# Patient Record
Sex: FEMALE
[De-identification: names, ages, dates, MRNs, and addresses within clinical notes are randomized per-mention and may not be internally consistent; named-entity substitution may affect disease eponyms.]

---

## 2019-04-24 ENCOUNTER — HOSPITAL ENCOUNTER (INPATIENT)
Dept: HOSPITAL 88 - ER | Age: 69
LOS: 3 days | Discharge: HOME | DRG: 68 | End: 2019-04-27
Attending: INTERNAL MEDICINE | Admitting: INTERNAL MEDICINE
Payer: MEDICARE

## 2019-04-24 VITALS — SYSTOLIC BLOOD PRESSURE: 195 MMHG | DIASTOLIC BLOOD PRESSURE: 86 MMHG

## 2019-04-24 VITALS — DIASTOLIC BLOOD PRESSURE: 81 MMHG | SYSTOLIC BLOOD PRESSURE: 183 MMHG

## 2019-04-24 VITALS — SYSTOLIC BLOOD PRESSURE: 216 MMHG | DIASTOLIC BLOOD PRESSURE: 94 MMHG

## 2019-04-24 VITALS — WEIGHT: 208.06 LBS | HEIGHT: 64 IN | BODY MASS INDEX: 35.52 KG/M2

## 2019-04-24 DIAGNOSIS — S52.571A: ICD-10-CM

## 2019-04-24 DIAGNOSIS — D63.8: ICD-10-CM

## 2019-04-24 DIAGNOSIS — I12.9: ICD-10-CM

## 2019-04-24 DIAGNOSIS — Z79.4: ICD-10-CM

## 2019-04-24 DIAGNOSIS — I66.09: Primary | ICD-10-CM

## 2019-04-24 DIAGNOSIS — I69.351: ICD-10-CM

## 2019-04-24 DIAGNOSIS — I51.7: ICD-10-CM

## 2019-04-24 DIAGNOSIS — E78.5: ICD-10-CM
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
## 2019-04-24 DIAGNOSIS — E66.9: ICD-10-CM
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
## 2019-04-24 DIAGNOSIS — E11.22: ICD-10-CM

## 2019-04-24 DIAGNOSIS — N18.9: ICD-10-CM

## 2019-04-24 DIAGNOSIS — E11.65: ICD-10-CM

## 2019-04-24 DIAGNOSIS — Z82.49: ICD-10-CM

## 2019-04-24 LAB
ALBUMIN SERPL-MCNC: 3.3 G/DL (ref 3.5–5)
ALBUMIN/GLOB SERPL: 0.8 {RATIO} (ref 0.8–2)
ALP SERPL-CCNC: 96 IU/L (ref 40–150)
ALT SERPL-CCNC: 17 IU/L (ref 0–55)
ANION GAP SERPL CALC-SCNC: 14.8 MMOL/L (ref 8–16)
BACTERIA URNS QL MICRO: (no result) /HPF
BASOPHILS # BLD AUTO: 0.1 10*3/UL (ref 0–0.1)
BASOPHILS NFR BLD AUTO: 0.6 % (ref 0–1)
BILIRUB UR QL: NEGATIVE
BUN SERPL-MCNC: 17 MG/DL (ref 7–26)
BUN/CREAT SERPL: 19 (ref 6–25)
CALCIUM SERPL-MCNC: 9.6 MG/DL (ref 8.4–10.2)
CHLORIDE SERPL-SCNC: 108 MMOL/L (ref 98–107)
CK MB SERPL-MCNC: 0.9 NG/ML (ref 0–5)
CK MB SERPL-MCNC: 1.1 NG/ML (ref 0–5)
CK SERPL-CCNC: 107 IU/L (ref 29–168)
CK SERPL-CCNC: 114 IU/L (ref 29–168)
CLARITY UR: CLEAR
CO2 SERPL-SCNC: 20 MMOL/L (ref 22–29)
COLOR UR: YELLOW
DEPRECATED APTT PLAS QN: 27.5 SECONDS (ref 23.8–35.5)
DEPRECATED INR PLAS: 0.81
DEPRECATED NEUTROPHILS # BLD AUTO: 6.8 10*3/UL (ref 2.1–6.9)
DEPRECATED RBC URNS MANUAL-ACNC: (no result) /HPF (ref 0–5)
EGFRCR SERPLBLD CKD-EPI 2021: > 60 ML/MIN (ref 60–?)
EOSINOPHIL # BLD AUTO: 0.4 10*3/UL (ref 0–0.4)
EOSINOPHIL NFR BLD AUTO: 4 % (ref 0–6)
EPI CELLS URNS QL MICRO: (no result) /LPF
ERYTHROCYTE [DISTWIDTH] IN CORD BLOOD: 18.3 % (ref 11.7–14.4)
GLOBULIN PLAS-MCNC: 3.9 G/DL (ref 2.3–3.5)
GLUCOSE SERPLBLD-MCNC: 131 MG/DL (ref 74–118)
HCT VFR BLD AUTO: 43.2 % (ref 34.2–44.1)
HGB BLD-MCNC: 13.1 G/DL (ref 12–16)
KETONES UR QL STRIP.AUTO: NEGATIVE
LEUKOCYTE ESTERASE UR QL STRIP.AUTO: NEGATIVE
LYMPHOCYTES # BLD: 1.9 10*3/UL (ref 1–3.2)
LYMPHOCYTES NFR BLD AUTO: 19.4 % (ref 18–39.1)
MAGNESIUM SERPL-MCNC: 1.9 MG/DL (ref 1.3–2.1)
MCH RBC QN AUTO: 23.9 PG (ref 28–32)
MCHC RBC AUTO-ENTMCNC: 30.3 G/DL (ref 31–35)
MCV RBC AUTO: 78.7 FL (ref 81–99)
MONOCYTES # BLD AUTO: 0.7 10*3/UL (ref 0.2–0.8)
MONOCYTES NFR BLD AUTO: 6.9 % (ref 4.4–11.3)
NEUTS SEG NFR BLD AUTO: 68.4 % (ref 38.7–80)
NITRITE UR QL STRIP.AUTO: NEGATIVE
PLATELET # BLD AUTO: 322 X10E3/UL (ref 140–360)
POTASSIUM SERPL-SCNC: 3.8 MMOL/L (ref 3.5–5.1)
PROT UR QL STRIP.AUTO: (no result)
PROTHROMBIN TIME: 11.7 SECONDS (ref 11.9–14.5)
RBC # BLD AUTO: 5.49 X10E6/UL (ref 3.6–5.1)
SODIUM SERPL-SCNC: 139 MMOL/L (ref 136–145)
SP GR UR STRIP: 1.01 (ref 1.01–1.02)
TSH SERPL DL<=0.005 MIU/L-ACNC: 1.55 UIU/ML (ref 0.35–4.94)
UROBILINOGEN UR STRIP-MCNC: 0.2 MG/DL (ref 0.2–1)
WBC #/AREA URNS HPF: (no result) /HPF (ref 0–5)

## 2019-04-24 PROCEDURE — 93306 TTE W/DOPPLER COMPLETE: CPT

## 2019-04-24 PROCEDURE — 87086 URINE CULTURE/COLONY COUNT: CPT

## 2019-04-24 PROCEDURE — 78452 HT MUSCLE IMAGE SPECT MULT: CPT

## 2019-04-24 PROCEDURE — 83735 ASSAY OF MAGNESIUM: CPT

## 2019-04-24 PROCEDURE — 81001 URINALYSIS AUTO W/SCOPE: CPT

## 2019-04-24 PROCEDURE — 70450 CT HEAD/BRAIN W/O DYE: CPT

## 2019-04-24 PROCEDURE — 85610 PROTHROMBIN TIME: CPT

## 2019-04-24 PROCEDURE — 82948 REAGENT STRIP/BLOOD GLUCOSE: CPT

## 2019-04-24 PROCEDURE — 71045 X-RAY EXAM CHEST 1 VIEW: CPT

## 2019-04-24 PROCEDURE — 80053 COMPREHEN METABOLIC PANEL: CPT

## 2019-04-24 PROCEDURE — 82550 ASSAY OF CK (CPK): CPT

## 2019-04-24 PROCEDURE — 84484 ASSAY OF TROPONIN QUANT: CPT

## 2019-04-24 PROCEDURE — 84443 ASSAY THYROID STIM HORMONE: CPT

## 2019-04-24 PROCEDURE — 99285 EMERGENCY DEPT VISIT HI MDM: CPT

## 2019-04-24 PROCEDURE — 70551 MRI BRAIN STEM W/O DYE: CPT

## 2019-04-24 PROCEDURE — 80061 LIPID PANEL: CPT

## 2019-04-24 PROCEDURE — 82553 CREATINE MB FRACTION: CPT

## 2019-04-24 PROCEDURE — 83036 HEMOGLOBIN GLYCOSYLATED A1C: CPT

## 2019-04-24 PROCEDURE — 85025 COMPLETE CBC W/AUTO DIFF WBC: CPT

## 2019-04-24 PROCEDURE — 83880 ASSAY OF NATRIURETIC PEPTIDE: CPT

## 2019-04-24 PROCEDURE — 93005 ELECTROCARDIOGRAM TRACING: CPT

## 2019-04-24 PROCEDURE — 85730 THROMBOPLASTIN TIME PARTIAL: CPT

## 2019-04-24 PROCEDURE — 93880 EXTRACRANIAL BILAT STUDY: CPT

## 2019-04-24 PROCEDURE — 93017 CV STRESS TEST TRACING ONLY: CPT

## 2019-04-24 PROCEDURE — 36415 COLL VENOUS BLD VENIPUNCTURE: CPT

## 2019-04-24 RX ADMIN — INSULIN LISPRO SCH UNIT: 100 INJECTION, SOLUTION INTRAVENOUS; SUBCUTANEOUS at 20:20

## 2019-04-24 RX ADMIN — METFORMIN HYDROCHLORIDE SCH MG: 500 TABLET, FILM COATED ORAL at 17:31

## 2019-04-24 RX ADMIN — INSULIN LISPRO SCH UNIT: 100 INJECTION, SOLUTION INTRAVENOUS; SUBCUTANEOUS at 17:32

## 2019-04-24 RX ADMIN — SITAGLIPTIN SCH MG: 100 TABLET, FILM COATED ORAL at 17:31

## 2019-04-24 RX ADMIN — INSULIN GLARGINE SCH UNITS: 100 INJECTION, SOLUTION SUBCUTANEOUS at 20:30

## 2019-04-24 RX ADMIN — ENOXAPARIN SODIUM SCH MG: 40 INJECTION SUBCUTANEOUS at 17:31

## 2019-04-24 RX ADMIN — Medication PRN MG: at 21:41

## 2019-04-24 RX ADMIN — FAMOTIDINE SCH MG: 20 TABLET, FILM COATED ORAL at 17:31

## 2019-04-24 RX ADMIN — HYDRALAZINE HYDROCHLORIDE PRN MG: 20 INJECTION INTRAMUSCULAR; INTRAVENOUS at 20:30

## 2019-04-24 RX ADMIN — PRAVASTATIN SODIUM SCH MG: 20 TABLET ORAL at 20:35

## 2019-04-24 RX ADMIN — INSULIN LISPRO SCH UNIT: 100 INJECTION, SOLUTION INTRAVENOUS; SUBCUTANEOUS at 20:30

## 2019-04-24 NOTE — DIAGNOSTIC IMAGING REPORT
Exam: Right wrist radiographs-3 views



History: Status post fall with right wrist pain.



Comparison: None.



Findings:  

There is a mildly displaced and impacted, comminuted, intra-articular, distal

radial fracture with dorsal angulation.

There is borderline mild widening of the scapholunate distance, measuring 3.8

mm. Carpal alignment is otherwise unremarkable. The distal ulna is unremarkable

in appearance.



Impression:

Mildly displaced, comminuted, intra-articular fracture of the distal radius

with dorsal angulation. 



Scapholunate distance measures at the upper limit of normal on one of the

views. Ligamentous injury is possible. 



Signed by: Dr. Carline Mendes MD on 4/24/2019 10:24 AM

## 2019-04-24 NOTE — NUR
Received patient via stretcher. Accompanied by daughter. AAOx4 to time, person,place, 
situation. Respirations even and unlabored. Tele #[id number] SR [id number]. Sling and splint to right arm. 
C/o pain to right wrist rates 4/10. Patient states "I don't need pain medication. Pain is 
tolerable." Oriented patient to room. Instructed to use call light for assistance. Voiced 
understanding.

## 2019-04-24 NOTE — XMS REPORT
Patient Summary Document

                             Created on: 2019



GERSON TERRELL

External Reference #: [id number]

: 1950

Sex: Female



Demographics







                          Address                   [address] [place]  [address]

 

                          Home Phone                [phone]

 

                          Preferred Language        Unknown

 

                          Marital Status            Unknown

 

                          Anglican Affiliation     Unknown

 

                          Race                      Unknown

 

                          Ethnic Group              Unknown





Author







                          Author                    [facility]

 

                          Address                   Unknown

 

                          Phone                     Unavailable







Support







                Name            Relationship    Address         Phone

 

                    GURPREET TERRELL    PRS                 [address], [place]  [id number] [phone]

 

                    XANDERGURPREET    PRS                 [address], [place]  [address] [phone]

 

                    GURPREET TERRELL    PRS                 [address] [place]  [address] [phone]







Care Team Providers







                    Care Team Member Name    Role                Phone

 

                          Unavailable               Unavailable







Payers







             Payer Name    Policy Type    Policy Number    Effective Date    Expiration Date







Problems

This patient has no known problems.



Allergies, Adverse Reactions, Alerts







          Allergy Name    Allergy Type    Status    Severity    Reaction(s)    Onset Date    Inactive 

Date                      Treating Clinician        Comments

 

        bhavik BATRES      Active    U               2014 00:00:00                     







Medications

This patient has no known medications.

## 2019-04-24 NOTE — DIAGNOSTIC IMAGING REPORT
EXAMINATION:  CHEST SINGLE (PORTABLE)    



INDICATION: Right sided weakness, numbness. 



COMPARISON:  None

     

FINDINGS:

TUBES and LINES:  None.



LUNGS:  Lungs are well inflated.  There is no evidence of pneumonia or

pulmonary edema.



PLEURA:  No pleural effusion or pneumothorax. 



HEART AND MEDIASTINUM:  The cardiomediastinal silhouette is unremarkable.    



BONES AND SOFT TISSUES:  No acute osseous abnormality. Partially seen fixation

hardware in the right proximal humerus.



UPPER ABDOMEN: No free air under the diaphragm.    



IMPRESSION: 

No acute radiographic abnormality.



Signed by: Dr. Carline Mendes MD on 4/24/2019 10:15 AM

## 2019-04-24 NOTE — DIAGNOSTIC IMAGING REPORT
Exam: Head CT without contrast

History:  Right-sided numbness

Comparison studies:  None



Technique:

Axial images were obtained from the skull base to the vertex.

Coronal and sagittal images reconstructed from the axial data.  Dose

modulation, iterative reconstruction, and/or weight based adjustment of the

mA/kV was utilized to reduce the radiation dose to as low as reasonably

achievable.  



Radiation dose: 

Total DLP: 921 mGy*cm. 

Estimated effective dose: DLP x 0.015 

Intravenous contrast: None



Findings:



Scalp: No abnormalities.

Bones: No fractures, blastic or lytic lesions.



Brain sulci: Appropriate for age.

Ventricles: Normal in size and configuration. No hydrocephalus.

Extra-axial spaces: No masses, no fluid collection. 



Parenchyma: 

No mass, acute hemorrhage or acute or chronic cortical insults. Subtle right

anterior subinsular hypodensity adjacent to the anterior margin of the right

lentiform nucleus may be a small chronic lacunar infarct. 



Sellar/suprasellar region: No abnormalities.

Craniocervical junction: Patent foramen magnum. No Chiari one malformation.



Incidental findings: 

Atherosclerotic calcifications in the carotid siphons.



IMPRESSION:

 

1.  No acute intracranial abnormalities.

2.  Small right anterior subinsular lacunar infarct.



Signed by: Dr. Gasper Lafleur M.D. on 4/24/2019 9:32 AM

## 2019-04-24 NOTE — DIAGNOSTIC IMAGING REPORT
History: TIA

Comparison studies: CT head 4/24/19



Technique: 

Sagittal T2; axial DWI, FLAIR, MPGR, T1, Coronal FLAIR.

Intravenous contrast: None



Findings:



Scalp: Normal in signal . No masses .

Bone marrow: Normal in signal intensity.



Extra-axial:

No masses, no fluid collections. 



Brain sulci: Appropriate for age.

Ventricles: Normal in size . No hydrocephalus .



Parenchyma:

Again seen small chronic lacunar infarct at the right anterior subinsular

region. 

No masses, hemorrhage, acute or chronic vascular insults.



Suprasellar region: No abnormalities.

Craniocervical junction: No abnormalities.  Patent foramen magnum.  No Chiari

one malformation.

Vessels: Normal flow-voids in the arteries and sinuses.



IMPRESSION:



1.  No acute abnormalities.

2.  Small chronic lacunar infarct at the right anterior subinsular region



Signed by: DR Alfonzo Mejia M.D. on 4/24/2019 6:39 PM

## 2019-04-24 NOTE — CONSULTATION
DATE OF CONSULTATION:  2019

 

Neurology Consult Note 

 

HISTORY OF PRESENT ILLNESS:  Ms. Ramos is a 69-year-old right-hand dominant 
woman

with multiple vascular risk factors admitted to [facility] on 2019, with a transient ischemic attack. 

 

On the morning of admission, the patient was pulling weeds in her garden when 
she

experienced a sudden onset of tingling beginning in the right hand, then moving

proximally to the right shoulder and right side of the face, then spreading over
the

right hemibody.  Ms. Ramos endorses numbness and weakness in the same 
distribution.

The patient does not report a visual field cut or other disturbance, dysarthria,

dizziness, or confusion associated with above symptoms.  Ms. Ramos reports 
possible

expressive aphasia as well as gait impairment associated with the above 
symptoms. 

 

The symptoms described above persisted for approximately 5 to 10 minutes, then

spontaneously resolved.  While these symptoms were present, the patient's 


notified emergency medical services.  Ms. Ramos was transported to the 
emergency

center at [facility] via ambulance for further evaluation. 

 

Upon arrival in the emergency center, the patient was afebrile with a blood 
pressure of

138/108 mmHg and a pulse of 67 beats per minute.  Her neurological examination 
was

documented as being nonfocal.  A CT of the brain without contrast was performed.
 This

study did not reveal evidence of recent large territorial ischemia or 
hemorrhage.  Ms. Ramos is admitted to [facility] as an inpatient for further 
evaluation

and treatment of her symptoms. 

 

REVIEW OF SYSTEMS:

Possible expressive aphasia, weakness of the right arm and leg, numbness and 
tingling of

the right arm and leg, impairment of balance and gait.  Otherwise, a 12-point 
review of

systems is negative. 

 

PAST MEDICAL HISTORY:  Hypertension, hyperlipidemia, diabetes mellitus type 2, 
and

reported mild chronic kidney disease. 

 

PAST SURGICAL HISTORY:  Total hysterectomy, cholecystectomy, appendectomy, 
repair of a

right shoulder fracture. 

 

PAST HOSPITALIZATIONS:  Surgeries/procedures as listed, childbirth x2.

 

FAMILY MEDICAL HISTORY:  The patient's paternal and maternal grandparents are 
.

Their medical histories are unknown.  The patient's father is  from end-
stage

renal disease.  He had liver cancer as well.  The patient's mother is . 
Her

medical history was significant for diabetes mellitus.  Ms. Ramos has three

siblings, two brothers and one sister, all of whom are living.  One brother and 
the

patient's sister have diabetes mellitus.  The second brother is healthy.  Ms. Ramos

has two children, a son and a daughter, both of whom are alive and healthy. 

 

SOCIAL HISTORY:  Ms. Ramos is .  She is retired.  The patient does 
not report

current or prior tobacco or recreational drug use.  The patient endorses rare 
alcohol

consumption. 

 

HOME MEDICATIONS:  Reviewed.  Please see the list of home medications available 
in the

electronic medical record. 

 

HOSPITAL MEDICATIONS:  Reviewed.  Please see the list of hospital medications 
available

in the electronic medical record. 

 

ALLERGIES:  NO KNOWN DRUG ALLERGIES.  NO KNOWN FOOD ALLERGIES.  NO KNOWN 
ALLERGIES TO

LATEX.  NO KNOWN ALLERGIES TO IODINE OR OTHER CONTRAST MATERIALS. 

 

PHYSICAL EXAMINATION:

VITAL SIGNS:  Height 64 inches, weight 210 pounds, BMI 36.0 kg/m2, blood 
pressure 142/63

mmHg, pulse 67 beats per minute, respiratory rate 17 breaths per minute, and 
oxygen

saturation 95% on room air. 

GENERAL:  The patient is awake and alert, does not appear distressed.  Obese. 

HEENT:  Normocephalic, atraumatic.  Pupils are equal, round, and reactive to 
light.

Moist mucous membranes. 

NECK:  Supple.  No appreciable thyromegaly.  No appreciable carotid bruits. 

CARDIOVASCULAR:  S1, S2, regular rate and rhythm.  No murmurs, rubs, or gallops.


RESPIRATORY:  Clear to auscultation bilaterally.  No wheezes, rhonchi, or rales.


EXTREMITIES:  The skin is warm and dry.  No clubbing, cyanosis, or edema.  The 
posterior

tibial and dorsalis pedis pulses are 2+ and symmetric.  The right arm is wrapped
in a

splint and sling. 

SKIN:  No rashes or lesions. 

NEUROLOGIC:  The right arm is immobilized in a splint and sling.  This extremity
is not

examined during the neurological examination. 

MEMORY/ATTENTION:  The patient is awake and alert, oriented to person, place, 
time, and

situation. 

CRANIAL NERVES:  Cranial nerve I-not tested.  Cranial nerve II, III, IV, and VI-
pupils

are equal and round, react briskly to light (from 4 mm to 2 mm).  Extraocular 
movements

intact.  No nystagmus.  Cranial nerve V-sensation to light touch and pinprick is
intact

in the bilateral V1 through V3 distributions.  Strength in the temporalis and 
masseter

muscles within normal limits.  Cranial nerve VII-the face is symmetric as are 
all facial

movements.  Strength is within normal limits.  Cranial nerve VIII-hearing is 
intact to

finger rub bilaterally.  Cranial nerve IX, X-soft palate elevates equally and

symmetrically.  Cranial nerve XI-normal strength of the bilateral 
sternocleidomastoid

and trapezius muscles.  Cranial nerve XII-the tongue protrudes in midline and 
moves

symmetrically from side-to-side. 

STRENGTH:  Bulk is normal.  Strength is 5/5 in the bilateral deltoids, biceps, 
triceps,

wrist flexors and extensors, finger flexors and extensors, intrinsic hand 
muscles, hip

flexors, knee flexors and extensors, ankle dorsiflexion and plantar flexion, and

intrinsic foot muscles excluding the right arm.  Tone is normal. 

DTRs:  Deep tendon reflexes are 2+ and symmetric at the triceps, biceps,

brachioradialis, patellas, and Achilles, excluding the right arm.  Plantar 
responses are

flexor bilaterally. 

SENSATION:  Sensation is intact to light touch and pinprick in both arms and 
both legs. 

CEREBELLAR:  Finger-nose-finger and heel-shin movements are intact without 
dysmetria or

other impairment excluding the right arm. 

GAIT:  Deferred. 

SPEECH:  Spontaneous speech is normal without appreciable dysarthria or aphasia.

Repetition is intact. 

INVOLUNTARY MOVEMENTS:  None. 

PRONATOR DRIFT:  None excluding the right arm.



LABORATORY DATA:  A comprehensive metabolic panel is significant for chloride of
108,

carbon dioxide of 20, glucose of 131, albumin of 3.3, and globulin of 3.9.

B-natriuretic peptide is elevated at 121.7.  CBC with differential and platelets
reveals

white blood cell count of 9.97 with a normal differential.  Hemoglobin and 
hematocrit

are 13.1 and 43.2, respectively.  The platelet count is 322.  PT 11.7, INR 0.81,
PTT

27.5.  Urinalysis is significant for 1+ protein, 3+ glucose, and 1+ blood.  
Urine

culture has been collected and is pending. 

 

DIAGNOSTIC STUDIES:  Electrocardiogram on 2019:  Normal sinus rhythm at 60
beats

per minute.  Left axis deviation.  Incomplete right bundle-branch block.  
Voltage

criteria for left ventricular hypertrophy. 

 

Bilateral carotid artery ultrasound with Doppler on 2019:  There is no

atherosclerosis in either carotid artery system.  Flow is antegrade in the 
bilateral

vertebral arteries. 

 

CT of the brain without contrast on 2019:  On my review, there is no 
evidence of

recent or remote large territorial ischemia, hemorrhage, mass, or mass effect.  
Cerebral

volumes are appropriate for age.  There are findings compatible with mild 
chronic small

vessel ischemic disease. 

 

Wrist x-ray on 2019:  Mildly displaced comminuted intra-articular fracture
of the

distal radius with dorsal angulation.  Scapholunate distance measures at the 
upper limit

of normal on one of the views.  Ligamentous injury is possible. 

 

Chest x-ray on 2019:  No acute radiographic abnormality. 

 

Echocardiogram on 2019:  Pending.

 

ASSESSMENT AND PLAN:  Ms. Ramos is a 69-year-old right-hand dominant woman 
with

multiple vascular risk factors admitted to [facility] with a 
transient

ischemic attack.  At present, her neurological examination is nonfocal excluding
the

right arm.  The patient's laboratory data and other diagnostic studies have been

reviewed and are documented above. 

 

The patient will undergo a complete stroke evaluation as follows:

1. A lipid panel has been ordered and is pending.  Hemoglobin A1c will be 
ordered.

2. An echocardiogram has been ordered and is pending.

3. MRI of the brain without contrast has been ordered and is pending.

4. The patient will be prescribed aspirin 325 mg by mouth daily for stroke 
prophylaxis.

5. Allow permissive hypertension for 24 to 48 hours following a transient 
ischemic

attack or stroke.  The patient's home antihypertensive medications will be held.
 Ms. Ramos will be treated with hydralazine 10 mg intravenously every 4 hours as 
needed

for systolic blood pressure greater than 200 mmHg or diastolic blood pressure 
greater

than 110 mmHg. 

6. The patient's goal total cholesterol is less than 200 with LDL of less than 
70.

Follow up the results of the lipid panel.  In the interim, treatment with the 
patient's

home medication of pravastatin will be continued. 

7. The patient's goal hemoglobin A1c is less than 7.0.  Follow up the results of
the

hemoglobin A1c.  In the interim, continue treatment with sliding scale insulin 
per

protocol.  Tight glycemic control is recommended while the patient is 
hospitalized. 

8. Speech and Physical Therapy consultations will be deferred as the patient has
no

current deficits. 

9. GI prophylaxis with Pepcid 20 mg by mouth with meals twice daily.  DVT 
prophylaxis

with Lovenox 40 mg injected subcutaneously daily. 

10. Defer treatment of the remaining medical comorbidities to the primary and 
other

services following the patient. 

 

Thank you for this consultation.  I will continue to follow the patient while 
she

remains in the hospital. 

 

TIME SPENT:  50 minutes.

 

 

 

 

______________________________

Maya Ramirez MD CP/BOB

D:  2019 16:06:18

T:  2019 18:27:09

Job #:  [id number]

 

MTDMARY JO

## 2019-04-25 VITALS — DIASTOLIC BLOOD PRESSURE: 89 MMHG | SYSTOLIC BLOOD PRESSURE: 203 MMHG

## 2019-04-25 VITALS — SYSTOLIC BLOOD PRESSURE: 204 MMHG | DIASTOLIC BLOOD PRESSURE: 89 MMHG

## 2019-04-25 VITALS — DIASTOLIC BLOOD PRESSURE: 89 MMHG | SYSTOLIC BLOOD PRESSURE: 204 MMHG

## 2019-04-25 VITALS — SYSTOLIC BLOOD PRESSURE: 191 MMHG | DIASTOLIC BLOOD PRESSURE: 83 MMHG

## 2019-04-25 VITALS — SYSTOLIC BLOOD PRESSURE: 190 MMHG | DIASTOLIC BLOOD PRESSURE: 86 MMHG

## 2019-04-25 VITALS — DIASTOLIC BLOOD PRESSURE: 79 MMHG | SYSTOLIC BLOOD PRESSURE: 178 MMHG

## 2019-04-25 LAB
ALBUMIN SERPL-MCNC: 3 G/DL (ref 3.5–5)
ALBUMIN/GLOB SERPL: 0.8 {RATIO} (ref 0.8–2)
ALP SERPL-CCNC: 87 IU/L (ref 40–150)
ALT SERPL-CCNC: 15 IU/L (ref 0–55)
ANION GAP SERPL CALC-SCNC: 15 MMOL/L (ref 8–16)
BASOPHILS # BLD AUTO: 0.1 10*3/UL (ref 0–0.1)
BASOPHILS NFR BLD AUTO: 0.4 % (ref 0–1)
BUN SERPL-MCNC: 19 MG/DL (ref 7–26)
BUN/CREAT SERPL: 20 (ref 6–25)
CALCIUM SERPL-MCNC: 9.6 MG/DL (ref 8.4–10.2)
CHLORIDE SERPL-SCNC: 106 MMOL/L (ref 98–107)
CHOLEST SERPL-MCNC: 163 MD/DL (ref 0–199)
CHOLEST/HDLC SERPL: 4.1 {RATIO} (ref 3–3.6)
CK MB SERPL-MCNC: 0.6 NG/ML (ref 0–5)
CK SERPL-CCNC: 79 IU/L (ref 29–168)
CO2 SERPL-SCNC: 20 MMOL/L (ref 22–29)
DEPRECATED NEUTROPHILS # BLD AUTO: 7.9 10*3/UL (ref 2.1–6.9)
EGFRCR SERPLBLD CKD-EPI 2021: 58 ML/MIN (ref 60–?)
EOSINOPHIL # BLD AUTO: 0.1 10*3/UL (ref 0–0.4)
EOSINOPHIL NFR BLD AUTO: 1.2 % (ref 0–6)
ERYTHROCYTE [DISTWIDTH] IN CORD BLOOD: 18.4 % (ref 11.7–14.4)
GLOBULIN PLAS-MCNC: 3.7 G/DL (ref 2.3–3.5)
GLUCOSE SERPLBLD-MCNC: 211 MG/DL (ref 74–118)
HCT VFR BLD AUTO: 40.5 % (ref 34.2–44.1)
HDLC SERPL-MSCNC: 40 MG/DL (ref 40–60)
HGB BLD-MCNC: 12.3 G/DL (ref 12–16)
LDLC SERPL CALC-MCNC: 54 MG/DL (ref 60–130)
LYMPHOCYTES # BLD: 2.2 10*3/UL (ref 1–3.2)
LYMPHOCYTES NFR BLD AUTO: 19.3 % (ref 18–39.1)
MCH RBC QN AUTO: 24 PG (ref 28–32)
MCHC RBC AUTO-ENTMCNC: 30.4 G/DL (ref 31–35)
MCV RBC AUTO: 79.1 FL (ref 81–99)
MONOCYTES # BLD AUTO: 1 10*3/UL (ref 0.2–0.8)
MONOCYTES NFR BLD AUTO: 8.6 % (ref 4.4–11.3)
NEUTS SEG NFR BLD AUTO: 69.9 % (ref 38.7–80)
PLATELET # BLD AUTO: 349 X10E3/UL (ref 140–360)
POTASSIUM SERPL-SCNC: 4 MMOL/L (ref 3.5–5.1)
RBC # BLD AUTO: 5.12 X10E6/UL (ref 3.6–5.1)
SODIUM SERPL-SCNC: 137 MMOL/L (ref 136–145)
TRIGL SERPL-MCNC: 346 MG/DL (ref 0–149)

## 2019-04-25 RX ADMIN — ASPIRIN SCH MG: 81 TABLET, COATED ORAL at 08:40

## 2019-04-25 RX ADMIN — INSULIN LISPRO SCH UNIT: 100 INJECTION, SOLUTION INTRAVENOUS; SUBCUTANEOUS at 18:28

## 2019-04-25 RX ADMIN — Medication PRN MG: at 20:20

## 2019-04-25 RX ADMIN — Medication PRN MG: at 11:11

## 2019-04-25 RX ADMIN — INSULIN LISPRO SCH UNIT: 100 INJECTION, SOLUTION INTRAVENOUS; SUBCUTANEOUS at 08:40

## 2019-04-25 RX ADMIN — PRAVASTATIN SODIUM SCH MG: 20 TABLET ORAL at 20:14

## 2019-04-25 RX ADMIN — RAMIPRIL SCH MG: 5 CAPSULE ORAL at 18:25

## 2019-04-25 RX ADMIN — AMLODIPINE BESYLATE SCH MG: 5 TABLET ORAL at 18:26

## 2019-04-25 RX ADMIN — Medication PRN MG: at 07:11

## 2019-04-25 RX ADMIN — INSULIN GLARGINE SCH UNITS: 100 INJECTION, SOLUTION SUBCUTANEOUS at 20:15

## 2019-04-25 RX ADMIN — HYDRALAZINE HYDROCHLORIDE PRN MG: 20 INJECTION INTRAMUSCULAR; INTRAVENOUS at 20:20

## 2019-04-25 RX ADMIN — FENOFIBRATE SCH MG: 145 TABLET ORAL at 20:14

## 2019-04-25 RX ADMIN — SITAGLIPTIN SCH MG: 100 TABLET, FILM COATED ORAL at 18:25

## 2019-04-25 RX ADMIN — FAMOTIDINE SCH MG: 20 TABLET, FILM COATED ORAL at 18:26

## 2019-04-25 RX ADMIN — FAMOTIDINE SCH MG: 20 TABLET, FILM COATED ORAL at 08:40

## 2019-04-25 RX ADMIN — NEBIVOLOL HYDROCHLORIDE SCH MG: 10 TABLET ORAL at 18:25

## 2019-04-25 RX ADMIN — INSULIN LISPRO SCH UNIT: 100 INJECTION, SOLUTION INTRAVENOUS; SUBCUTANEOUS at 12:00

## 2019-04-25 RX ADMIN — Medication PRN MG: at 04:48

## 2019-04-25 RX ADMIN — METFORMIN HYDROCHLORIDE SCH MG: 500 TABLET, FILM COATED ORAL at 18:24

## 2019-04-25 RX ADMIN — ENOXAPARIN SODIUM SCH MG: 40 INJECTION SUBCUTANEOUS at 18:25

## 2019-04-25 NOTE — HISTORY AND PHYSICAL
CHIEF COMPLAINT:  Ms. Ramos is a pleasant 69-year-old woman, diabetic, hypertensive,

who presented to the emergency room on the morning of the 24th of April with a complaint

of right-sided weakness, falling, and injury to the right wrist. 

 

HISTORY OF PRESENT ILLNESS:  The patient reports that she had a similar incident on

Saturday, the 20th of April when she was at [employer] and she was using a 

and her right arm became weak and she could not stand, with numbness and tingling on the

right side and right face.  This resolved after about 10 minutes and she went on about

her business.  They recurred again today and she fell to the ground, injuring her right

wrist. 

 

PAST MEDICAL HISTORY:  Significant for longstanding diabetes and hypertension.

 

PREVIOUS SURGICAL HISTORY:  Includes a right shoulder surgery in December of 2018, which

she feels she is still recovering from.  She has had a remote hysterectomy,

appendectomy, and cholecystectomy. 

 

HOME MEDICATIONS:  Include Levemir 60 units at bedtime, pravastatin 20 mg daily, and

Janumet 50/1000 two tablets daily, Jardiance 25 mg, amlodipine 5 mg daily, Bystolic 10

mg daily, and ramipril 5 mg daily. 

 

PERSONAL AND SOCIAL HISTORY:  She does not smoke or drink.

 

ALLERGIES:  SHE IS ALLERGIC TO CODEINE.

 

PHYSICAL EXAMINATION:

GENERAL:  At this time shows no obese  woman, who is 5 feet 4 inches tall,

weighing 210 pounds. 

VITAL SIGNS:  Blood pressure earlier today 195/86. 

HEAD, EYES, EARS, NOSE, AND THROAT:  Unremarkable. 

NECK:  Thick.  No jugular venous distention.  No bruits. 

THORAX:  Heart sounds S1 and S2 are equal.  No murmurs. 

LUNGS:  Clear. 

ABDOMEN:  Protuberant.  Normal bowel sounds. 

EXTREMITIES:  No cyanosis, clubbing, or edema.  The right arm is in a splint.

LABORATORY DATA:  Initial laboratory studies show glucose 131.  White count 9.9.  The

x-ray of the right wrist shows fracture angulation.  CAT scan of the head suggests small

right subinsular lacunar infarct. 

 

ASSESSMENTS:  

1. Transient ischemic attack, now seemingly improved.

2. History of hypertension.

3. History of type 2 adult onset diabetes.

4. Right wrist fracture.

 

PLAN:  The patient has been given aspirin and we will monitor carefully.  She has

already been seen by Dr. Ramirez and studies are in progress.  We will ask for

Orthopedic consultation as well. 

 

 

 

 

______________________________

MD ROSENDA Negron/BOB

D:  04/24/2019 16:56:11

T:  04/25/2019 00:04:20

Job #:  [id number]

 

cc:            Lambert Ramirez MD

## 2019-04-25 NOTE — NUR
CASE MANAGEMENT ASSESSMENT 

 to bedside to discuss plan of care with patient/family. CM/SW role and care 
transitions discussed. Anticipated discharge plan discussed along with duration of care. 
CM/SW discussed patients right to make decisions in care.  CM/SW work hours given.  

Patient lives: with her  Alex 

Admit/Transfer: thru ED 

Hospital/ER visits since last admit: 0

POA/Emergency contact: Alex Ramos [phone]

Current/Previous Home Health: none

PCP/Follow-up Care: Pt will follow up with Dr. Thomas as soon as possible after she is 
discharges 

Current/Previous DME: none



Medications (referring to index hospitalization or the first time you were in the 
hospital)

a. Were changes made in your medications when you were in the hospital on [date of index 
hospitalization]?  n/a

b. Did you understand the changes? n/a

c. Were you able to obtain your new medications right away? n/a

d. Were you able to take your medications like the doctor wanted you to? n/a

e. Did the hospital give you an accurate, easy to understand list of medications when you 
left? n/a



Scale of 1-10 how comfortable does patient feel with disease management in outpatient 
setting: 10

Other Services: none

Employment Status: retired 

Areas of Concerns: TIA 

Referral Needs: none

Education Needs: TIA, safety precautions, medical management

IMM/MOON given and signed (if applicable): IMM was given on admission 

Goal for discharge: home 



CM/SW left business card at the bedside with contact information. Name and number was also 
written on the patients whiteboard. Patient verbalized understanding of discussion. CM will 
follow-up with ongoing discharge and transition of care needs.

## 2019-04-26 VITALS — SYSTOLIC BLOOD PRESSURE: 163 MMHG | DIASTOLIC BLOOD PRESSURE: 73 MMHG

## 2019-04-26 VITALS — SYSTOLIC BLOOD PRESSURE: 177 MMHG | DIASTOLIC BLOOD PRESSURE: 77 MMHG

## 2019-04-26 VITALS — DIASTOLIC BLOOD PRESSURE: 72 MMHG | SYSTOLIC BLOOD PRESSURE: 174 MMHG

## 2019-04-26 VITALS — DIASTOLIC BLOOD PRESSURE: 68 MMHG | SYSTOLIC BLOOD PRESSURE: 148 MMHG

## 2019-04-26 VITALS — DIASTOLIC BLOOD PRESSURE: 73 MMHG | SYSTOLIC BLOOD PRESSURE: 170 MMHG

## 2019-04-26 RX ADMIN — ASPIRIN SCH MG: 81 TABLET, COATED ORAL at 11:58

## 2019-04-26 RX ADMIN — SITAGLIPTIN SCH MG: 100 TABLET, FILM COATED ORAL at 17:26

## 2019-04-26 RX ADMIN — INSULIN LISPRO SCH UNIT: 100 INJECTION, SOLUTION INTRAVENOUS; SUBCUTANEOUS at 16:30

## 2019-04-26 RX ADMIN — METFORMIN HYDROCHLORIDE SCH MG: 500 TABLET, FILM COATED ORAL at 17:26

## 2019-04-26 RX ADMIN — INSULIN LISPRO SCH UNIT: 100 INJECTION, SOLUTION INTRAVENOUS; SUBCUTANEOUS at 20:21

## 2019-04-26 RX ADMIN — RAMIPRIL SCH MG: 5 CAPSULE ORAL at 11:58

## 2019-04-26 RX ADMIN — PRAVASTATIN SODIUM SCH MG: 20 TABLET ORAL at 20:16

## 2019-04-26 RX ADMIN — FAMOTIDINE SCH MG: 20 TABLET, FILM COATED ORAL at 11:57

## 2019-04-26 RX ADMIN — NEBIVOLOL HYDROCHLORIDE SCH MG: 10 TABLET ORAL at 11:59

## 2019-04-26 RX ADMIN — FAMOTIDINE SCH MG: 20 TABLET, FILM COATED ORAL at 17:25

## 2019-04-26 RX ADMIN — INSULIN LISPRO SCH UNIT: 100 INJECTION, SOLUTION INTRAVENOUS; SUBCUTANEOUS at 11:30

## 2019-04-26 RX ADMIN — INSULIN LISPRO SCH UNIT: 100 INJECTION, SOLUTION INTRAVENOUS; SUBCUTANEOUS at 07:30

## 2019-04-26 RX ADMIN — INSULIN GLARGINE SCH UNITS: 100 INJECTION, SOLUTION SUBCUTANEOUS at 20:21

## 2019-04-26 RX ADMIN — AMLODIPINE BESYLATE SCH MG: 5 TABLET ORAL at 11:59

## 2019-04-26 RX ADMIN — FENOFIBRATE SCH MG: 145 TABLET ORAL at 20:16

## 2019-04-26 RX ADMIN — ENOXAPARIN SODIUM SCH MG: 40 INJECTION SUBCUTANEOUS at 17:26

## 2019-04-26 NOTE — NUR
Patient visited in room during nursing rounds. Patient alert and oriented x3. Right forearm 
covered with soft cast and ace bandage dressing and wearing sling on right arm due to 
fracture on radial bone. Patient ambulatory prn. Family at bedside visiting. Call bell 
within reach. Will monitor closely.

## 2019-04-26 NOTE — NUR
IMM letter delivered and explained. Pt's  signed letter. Signed copy placed in chart. 
Copy given to pt's .

## 2019-04-26 NOTE — NUR
The pt. was received on N P O status for stress test this morning. She was consented and 
placed in gown for the procedure. Morning med pass will be given upon return from procedure.

## 2019-04-26 NOTE — MYOVIEW STRESS TEST
DATE OF STUDY:  04/26/2019 10:00:00

 

Stress Test - Treadmill ONLY

 

The patient had resting perfusion images after injection of 11 mCi of technetium-99m

Myoview.  Later due to inability to exercise, the patient was given 0.4 mg of Lexiscan

intravenously and shortly afterwards 33 mCi of technetium-99m Myoview.  Perfusion images

were taken by rotational tomography. 

 

Comparison of resting and Lexiscan stress images show no significant evidence of any

perfusion defect.  Additionally, gated wall motion images were obtained and calculated

ejection fraction normal at 61% without regional wall motion abnormalities identified. 

 

FINAL IMPRESSIONS:  

1. Normal Lexiscan Myoview for perfusion.

2. Normal left ventricular function with calculated ejection fraction of 61%.

 

 

 

______________________________

MD ROSENDA Negron/MODL

D:  04/26/2019 16:54:19

T:  04/26/2019 19:01:20

Job #:  [id number]

 

cc:            Maya Ramirez MD

## 2019-04-27 VITALS — DIASTOLIC BLOOD PRESSURE: 71 MMHG | SYSTOLIC BLOOD PRESSURE: 157 MMHG

## 2019-04-27 VITALS — DIASTOLIC BLOOD PRESSURE: 77 MMHG | SYSTOLIC BLOOD PRESSURE: 190 MMHG

## 2019-04-27 VITALS — SYSTOLIC BLOOD PRESSURE: 158 MMHG | DIASTOLIC BLOOD PRESSURE: 71 MMHG

## 2019-04-27 VITALS — SYSTOLIC BLOOD PRESSURE: 114 MMHG | DIASTOLIC BLOOD PRESSURE: 58 MMHG

## 2019-04-27 VITALS — SYSTOLIC BLOOD PRESSURE: 146 MMHG | DIASTOLIC BLOOD PRESSURE: 66 MMHG

## 2019-04-27 VITALS — SYSTOLIC BLOOD PRESSURE: 161 MMHG | DIASTOLIC BLOOD PRESSURE: 74 MMHG

## 2019-04-27 RX ADMIN — ASPIRIN SCH MG: 81 TABLET, COATED ORAL at 09:53

## 2019-04-27 RX ADMIN — INSULIN LISPRO SCH UNIT: 100 INJECTION, SOLUTION INTRAVENOUS; SUBCUTANEOUS at 09:55

## 2019-04-27 RX ADMIN — RAMIPRIL SCH MG: 5 CAPSULE ORAL at 09:53

## 2019-04-27 RX ADMIN — NEBIVOLOL HYDROCHLORIDE SCH MG: 10 TABLET ORAL at 09:53

## 2019-04-27 RX ADMIN — HYDRALAZINE HYDROCHLORIDE PRN MG: 20 INJECTION INTRAMUSCULAR; INTRAVENOUS at 05:00

## 2019-04-27 RX ADMIN — FAMOTIDINE SCH MG: 20 TABLET, FILM COATED ORAL at 09:53

## 2019-04-27 NOTE — NUR
pt alert resp even and unlabored, pt has no c/o pain at this time, pt is discharged 
prescription given, iv site removed, no swelling no redness to site.

## 2019-04-27 NOTE — NUR
pt awake sitting upright in bed resp even and unlabored at this time, pt able to make needs 
known, call light in reach.

## 2019-04-28 NOTE — DISCHARGE SUMMARY
HISTORY:  Ms. Ramos is pleasant 69-year-old diabetic hypertensive patient, who

presented to the emergency room on the 24th with a complaint of falling and fracturing

her right wrist. 

 

HOSPITAL COURSE:  The patient had neurologic deficit and right-sided weakness that had

resolved by the time she presented to the emergency room.  She was found to be

hypertensive and diabetes inadequately controlled.  Medications were adjusted.

Noninvasive studies were checked including carotid Doppler scan, which showed no

significant stenoses.  Echocardiogram which showed mild left ventricular hypertrophy.

Normal left ventricular function.  No septal defects or valvular problems.  She also had

a Lexiscan Myoview performed, which showed normal perfusion and normal left ventricular

function. 

 

Today, she has her right arm in a splint and will follow up with Dr. Thomas in the

office for further management of her right wrist fracture.  She will increase her

amlodipine to 10 mg daily and enteric-coated aspirin 325 mg daily, fenofibrate 145 mg

daily.  She will continue previous medications of insulin, Jardiance, nebivolol 10 mg

daily, pravastatin 20 mg daily, ramipril 20 mg daily and Janumet 50/1000 two tabs daily. 

 

DISCHARGE DIAGNOSES:  

1. Transient ischemic attack/cerebrovascular accident.

2. Diabetes.

3. Hypertension.

4. Right wrist fracture.

 

 

 

 

______________________________

MD ROSENDA Negron/BOB

D:  04/27/2019 12:03:28

T:  04/28/2019 06:06:36

Job #:  [id number]

 

cc:            MD Lambert Martino MD